# Patient Record
(demographics unavailable — no encounter records)

---

## 2025-04-23 NOTE — HISTORY OF PRESENT ILLNESS
[de-identified] : Mr. Castro is a 73M with an extensive smoking history, noted to have an oral lesion on exam at a screening event.   Patient has been smoking 1/2ppd since he was 13 years old. Has not had frequent dental evaluations. He had not noted any masses, lesions or bleeding. No pain and no weight loss. Here today for biopsy with his wife who helps translate in addition to the chinese .

## 2025-04-23 NOTE — PROCEDURE
[FreeTextEntry3] :   Biopsy: Right Mandibular Gingiva Indication: Oral Lesion with smoking history  I discussed the risks, benefits and alternatives of biopsy. I explained the risks, including but not limited to bleeding, pain and infection. Written informed consent was obtained.  Area was anesthetized with lidocaine 1% with epinephrine 1:100,000.  A cupped forcep was used to take multiple samples from the left floor of mouth in the superficial and deep tissue.  Patient tolerated the procedure well. Bleeding was controlled with pressure.      Fiberoptic Laryngoscopy (88373) Indication: Extensive smoking history with oral lesion   Procedure performed: Fiberoptic Laryngeal Endoscopy - Diagnostic Pre-op/post op indication: Dysphonia Patient was unable to cooperate with mirror. After informed verbal consent is obtained, the fiberoptic nasal endoscope is passed via the nasal cavity. Evaluated the nasopharynx, base of tongue, vallecula, hypopharynx, supraglottis and glottis.   Findings: Normal scope exam.

## 2025-04-23 NOTE — ASSESSMENT
[FreeTextEntry1] : 73M with significant smoking history found to have a gingival lesion, pyogenic granuloma vs. neoplastic process. New nasal alar skin lesion as well - Biopsy done today - Will call with results. - Referred to dermatology for new growing skin lesion on his left nasal ala   Donna Ellison MD

## 2025-04-23 NOTE — PROCEDURE
[FreeTextEntry3] :   Biopsy: Right Mandibular Gingiva Indication: Oral Lesion with smoking history  I discussed the risks, benefits and alternatives of biopsy. I explained the risks, including but not limited to bleeding, pain and infection. Written informed consent was obtained.  Area was anesthetized with lidocaine 1% with epinephrine 1:100,000.  A cupped forcep was used to take multiple samples from the left floor of mouth in the superficial and deep tissue.  Patient tolerated the procedure well. Bleeding was controlled with pressure.      Fiberoptic Laryngoscopy (81950) Indication: Extensive smoking history with oral lesion   Procedure performed: Fiberoptic Laryngeal Endoscopy - Diagnostic Pre-op/post op indication: Dysphonia Patient was unable to cooperate with mirror. After informed verbal consent is obtained, the fiberoptic nasal endoscope is passed via the nasal cavity. Evaluated the nasopharynx, base of tongue, vallecula, hypopharynx, supraglottis and glottis.   Findings: Normal scope exam.

## 2025-04-23 NOTE — PHYSICAL EXAM
[TextEntry] :     GEN: Well nourished, A&Ox3, NAD, Voice: strong FACE: Normaocephalic, symmetric, no masses or deformities, parotids symmetric without masses EYES: EOMI, PERRLA, No periorbital edema or erythema EARS: No external deformity, EAC patent, TMs intact NOSE: No external deformity, anterior exam with normal inferior turbinates OC/OP: poor dentition, right mandibular lesion there is a 3mm friable raised lesion on the buccal surface NECK: Supple, trachea midline, full ROM, no LAD, thyroid without palpable nodules, SMGs symmetric NEURO: Cranial nerves grossly intact RESP: Easy work of breathing, symmetric chest rise PSYCH: Normal affect

## 2025-04-23 NOTE — HISTORY OF PRESENT ILLNESS
[de-identified] : Mr. Castro is a 73M with an extensive smoking history, noted to have an oral lesion on exam at a screening event.   Patient has been smoking 1/2ppd since he was 13 years old. Has not had frequent dental evaluations. He had not noted any masses, lesions or bleeding. No pain and no weight loss. Here today for biopsy with his wife who helps translate in addition to the chinese .

## 2025-04-29 NOTE — HISTORY OF PRESENT ILLNESS
[FreeTextEntry1] : NPA - Skin Lesion [de-identified] : Zakiya Castro 72 y/o M presents for skin lesion on left nasal ala. With wife who assists w translating for him.  Cantonese : Scarlett ([id number]) was also present virtually during encounter.  - Has grown in size  - Present for 2 years - No sxs.  - former smoker, quit 2 years ago - Referred by Dr Ellison who saw pt for oral lesion on 4/22/25. Bx was negative for malignancy    Personal history of skin cancer: No Family history of skin cancer: No History of blistering sunburns: No History of tanning bed use: No Uses sunscreen regularly: No

## 2025-04-29 NOTE — ASSESSMENT
[FreeTextEntry1] : #Sk #lentigines  Patient was reassured of the benign nature of these findings. No further treatment needed at this time. If any lesion changes or becomes symptomatic I recommend follow-up  #Neoplasm of Uncertain Behavior Location: L nasal ala Differential Diagnosis: IDN, verruca, ISK, SCC, BCC, angiofibroma  Recommendation: skin biopsy by shave technique   Clinical photographs were recommended in order to document the appearance and location of skin lesion/s.  Verbal consent obtained from the patient to proceed with photography. These photos will remain in the patient's electronic health record.   Biopsy by Shave Technique- Procedure Note Verbal consent was obtained and a time out was performed. The patient was counseled about the risk of bleeding, scar, and infection. The area was cleaned with an alcohol swab. Anesthesia: 1% lidocaine with 1:100,000 epinephrine buffered with sodium bicarbonate Procedure: Biopsy by shave technique The specimen was sent for pathologic examination. Hemostasis: aluminum chloride A bandage was placed and wound care was reviewed with the patient.  RTC prn

## 2025-04-29 NOTE — PHYSICAL EXAM
[FreeTextEntry3] : L nasal ala: appx 6mm pink/skin tone smooth papule, small dotted vessels  face: -Scattered tan waxy/keratotic, stuck-on appearing papules/plaques with pseudohorn cysts.  With dermoscopy, milia-like cysts and comedo-like openings are noted. - -Scattered tan smooth macules with regular borders and pigmentation. Many of these were examined with dermoscopy and had benign features.